# Patient Record
Sex: FEMALE | Race: WHITE | NOT HISPANIC OR LATINO | URBAN - METROPOLITAN AREA
[De-identification: names, ages, dates, MRNs, and addresses within clinical notes are randomized per-mention and may not be internally consistent; named-entity substitution may affect disease eponyms.]

---

## 2022-11-04 ENCOUNTER — OFFICE VISIT (OUTPATIENT)
Dept: URBAN - METROPOLITAN AREA CLINIC 46 | Facility: CLINIC | Age: 32
End: 2022-11-04

## 2022-12-06 ENCOUNTER — LAB OUTSIDE AN ENCOUNTER (OUTPATIENT)
Dept: URBAN - METROPOLITAN AREA CLINIC 44 | Facility: CLINIC | Age: 32
End: 2022-12-06

## 2022-12-06 ENCOUNTER — OFFICE VISIT (OUTPATIENT)
Dept: URBAN - METROPOLITAN AREA CLINIC 44 | Facility: CLINIC | Age: 32
End: 2022-12-06
Payer: COMMERCIAL

## 2022-12-06 VITALS
HEIGHT: 66 IN | BODY MASS INDEX: 44.03 KG/M2 | TEMPERATURE: 97.9 F | WEIGHT: 274 LBS | HEART RATE: 79 BPM | DIASTOLIC BLOOD PRESSURE: 94 MMHG | SYSTOLIC BLOOD PRESSURE: 156 MMHG

## 2022-12-06 DIAGNOSIS — R10.84 GENERALIZED ABDOMINAL PAIN: ICD-10-CM

## 2022-12-06 DIAGNOSIS — R10.13 DYSPEPSIA: ICD-10-CM

## 2022-12-06 DIAGNOSIS — K90.89 BILE SALT-INDUCED DIARRHEA: ICD-10-CM

## 2022-12-06 PROCEDURE — 99204 OFFICE O/P NEW MOD 45 MIN: CPT | Performed by: INTERNAL MEDICINE

## 2022-12-06 NOTE — HPI-TODAY'S VISIT:
31 yo F presents for an OV to discuss chest pressure and diarrhea. Onset of chest pressure began over a year ago as she mentions episodes of reflux and subsequent parasternal pressure. She does not take any medications other than OTC antiacids which have mixed results.  She also mentions watery diarrhea but thinks thisis mostly related to her IBS that she was told she had vs bile salt diarrhea after her cholecystectomy 8 years ago. She has tried Mylanta but this has little effect. Diarrhea has been ongoing for several months. She associates diffuse abdominal pain and has been taking Tramadol which has not relieved her pain. Denies hematochezia.  Interestingly, she mentions a diagnosis of H pylori x 3 times without evidence of eradication.

## 2022-12-07 ENCOUNTER — TELEPHONE ENCOUNTER (OUTPATIENT)
Dept: URBAN - METROPOLITAN AREA CLINIC 46 | Facility: CLINIC | Age: 32
End: 2022-12-07

## 2022-12-08 PROBLEM — 102614006: Status: ACTIVE | Noted: 2022-12-08

## 2022-12-08 LAB
A/G RATIO: 1.5
ABSOLUTE BASOPHILS: 48
ABSOLUTE EOSINOPHILS: 156
ABSOLUTE LYMPHOCYTES: 2736
ABSOLUTE MONOCYTES: 516
ABSOLUTE NEUTROPHILS: 8544
ALBUMIN: 4
ALKALINE PHOSPHATASE: 68
ALT (SGPT): 35
AST (SGOT): 22
BASOPHILS: 0.4
BILIRUBIN, TOTAL: 0.5
BUN/CREATININE RATIO: (no result)
BUN: 9
C-REACTIVE PROTEIN, QUANT: 39.8
CALCIUM: 9.2
CARBON DIOXIDE, TOTAL: 24
CHLORIDE: 104
CREATININE: 0.52
EGFR: 126
EOSINOPHILS: 1.3
GLOBULIN, TOTAL: 2.7
GLUCOSE: 87
HEMATOCRIT: 40.1
HEMOGLOBIN: 13
LYMPHOCYTES: 22.8
MCH: 26.5
MCHC: 32.4
MCV: 81.8
MONOCYTES: 4.3
MPV: 9.9
NEUTROPHILS: 71.2
PLATELET COUNT: 317
POTASSIUM: 4
PROTEIN, TOTAL: 6.7
RDW: 14.5
RED BLOOD CELL COUNT: 4.9
SED RATE BY MODIFIED: 22
SODIUM: 140
WHITE BLOOD CELL COUNT: 12

## 2022-12-09 ENCOUNTER — TELEPHONE ENCOUNTER (OUTPATIENT)
Dept: URBAN - METROPOLITAN AREA CLINIC 46 | Facility: CLINIC | Age: 32
End: 2022-12-09

## 2022-12-13 ENCOUNTER — TELEPHONE ENCOUNTER (OUTPATIENT)
Dept: URBAN - METROPOLITAN AREA CLINIC 46 | Facility: CLINIC | Age: 32
End: 2022-12-13

## 2022-12-23 ENCOUNTER — TELEPHONE ENCOUNTER (OUTPATIENT)
Dept: URBAN - METROPOLITAN AREA CLINIC 46 | Facility: CLINIC | Age: 32
End: 2022-12-23

## 2023-01-06 ENCOUNTER — OFFICE VISIT (OUTPATIENT)
Dept: URBAN - METROPOLITAN AREA SURGERY CENTER 27 | Facility: SURGERY CENTER | Age: 33
End: 2023-01-06

## 2023-01-10 ENCOUNTER — TELEPHONE ENCOUNTER (OUTPATIENT)
Dept: URBAN - METROPOLITAN AREA CLINIC 46 | Facility: CLINIC | Age: 33
End: 2023-01-10

## 2023-02-08 ENCOUNTER — WEB ENCOUNTER (OUTPATIENT)
Dept: URBAN - METROPOLITAN AREA CLINIC 44 | Facility: CLINIC | Age: 33
End: 2023-02-08

## 2023-02-20 ENCOUNTER — OFFICE VISIT (OUTPATIENT)
Dept: URBAN - METROPOLITAN AREA CLINIC 48 | Facility: CLINIC | Age: 33
End: 2023-02-20
Payer: COMMERCIAL

## 2023-02-20 VITALS
DIASTOLIC BLOOD PRESSURE: 79 MMHG | SYSTOLIC BLOOD PRESSURE: 124 MMHG | WEIGHT: 278 LBS | BODY MASS INDEX: 44.68 KG/M2 | HEIGHT: 66 IN | HEART RATE: 80 BPM | TEMPERATURE: 98.1 F

## 2023-02-20 DIAGNOSIS — R10.84 GENERALIZED ABDOMINAL PAIN: ICD-10-CM

## 2023-02-20 DIAGNOSIS — R10.13 DYSPEPSIA: ICD-10-CM

## 2023-02-20 DIAGNOSIS — K90.89 BILE SALT-INDUCED DIARRHEA: ICD-10-CM

## 2023-02-20 PROCEDURE — 99214 OFFICE O/P EST MOD 30 MIN: CPT | Performed by: INTERNAL MEDICINE

## 2023-02-20 RX ORDER — IBUPROFEN 800 MG/1
1 TABLET WITH FOOD OR MILK AS NEEDED TABLET, FILM COATED ORAL
Status: ACTIVE | COMMUNITY

## 2023-02-20 NOTE — HPI-TODAY'S VISIT:
31 yo F presents for an OV to discuss chronic chest pressure and watery diarrhea. Onset of chest pressure began over a year ago as she mentions episodes of reflux and subsequent parasternal pressure. She does take steroids and ibuprofen chronically for migraines.   She also mentions watery diarrhea but thinks this is mostly related to her IBS that she was told she had vs bile salt diarrhea after her cholecystectomy 8 years ago. She has tried Mylanta but this has little effect. Diarrhea has been ongoing for several months. She associates diffuse abdominal pain and has been taking Tramadol which has not relieved her pain. Denies hematochezia.  Interestingly, she mentions a diagnosis of H pylori x 3 times without evidence of eradication. We recommended an EGD/Colonoscopy for further evaluation on her last visit, but she declined as she mentions that she is "nervous and scared of a diagnosis".

## 2023-02-21 ENCOUNTER — TELEPHONE ENCOUNTER (OUTPATIENT)
Dept: URBAN - METROPOLITAN AREA CLINIC 48 | Facility: CLINIC | Age: 33
End: 2023-02-21

## 2023-02-21 PROBLEM — 69980003: Status: ACTIVE | Noted: 2022-12-06

## 2023-02-21 RX ORDER — IBUPROFEN 800 MG/1
1 TABLET WITH FOOD OR MILK AS NEEDED TABLET, FILM COATED ORAL
Status: ACTIVE | COMMUNITY

## 2023-02-21 RX ORDER — PANTOPRAZOLE SODIUM 40 MG/1
1 TABLET TABLET, DELAYED RELEASE ORAL ONCE A DAY
Qty: 30 | OUTPATIENT
Start: 2023-02-21

## 2023-02-21 RX ORDER — CHOLESTYRAMINE 4 G/9G
1 SCOOP POWDER, FOR SUSPENSION ORAL ONCE A DAY
Qty: 30 | OUTPATIENT
Start: 2023-02-21

## 2023-03-02 ENCOUNTER — WEB ENCOUNTER (OUTPATIENT)
Dept: URBAN - METROPOLITAN AREA CLINIC 48 | Facility: CLINIC | Age: 33
End: 2023-03-02

## 2023-07-28 ENCOUNTER — DASHBOARD ENCOUNTERS (OUTPATIENT)
Age: 33
End: 2023-07-28

## 2023-07-31 ENCOUNTER — OFFICE VISIT (OUTPATIENT)
Dept: URBAN - METROPOLITAN AREA CLINIC 48 | Facility: CLINIC | Age: 33
End: 2023-07-31

## 2023-07-31 RX ORDER — IBUPROFEN 800 MG/1
1 TABLET WITH FOOD OR MILK AS NEEDED TABLET, FILM COATED ORAL
Status: ACTIVE | COMMUNITY

## 2023-07-31 RX ORDER — CHOLESTYRAMINE 4 G/9G
1 SCOOP POWDER, FOR SUSPENSION ORAL ONCE A DAY
Qty: 30 | Status: ACTIVE | COMMUNITY
Start: 2023-02-21

## 2023-07-31 RX ORDER — PANTOPRAZOLE SODIUM 40 MG/1
1 TABLET TABLET, DELAYED RELEASE ORAL ONCE A DAY
Qty: 30 | Status: ACTIVE | COMMUNITY
Start: 2023-02-21

## 2025-08-06 ENCOUNTER — OFFICE VISIT (OUTPATIENT)
Dept: URBAN - METROPOLITAN AREA CLINIC 48 | Facility: CLINIC | Age: 35
End: 2025-08-06

## 2025-08-06 RX ORDER — PANTOPRAZOLE SODIUM 40 MG/1
1 TABLET TABLET, DELAYED RELEASE ORAL ONCE A DAY
Qty: 30 | Status: ACTIVE | COMMUNITY
Start: 2023-02-21

## 2025-08-06 RX ORDER — IBUPROFEN 800 MG/1
1 TABLET WITH FOOD OR MILK AS NEEDED TABLET, FILM COATED ORAL
Status: ACTIVE | COMMUNITY

## 2025-08-06 RX ORDER — CHOLESTYRAMINE 4 G/9G
1 SCOOP POWDER, FOR SUSPENSION ORAL ONCE A DAY
Qty: 30 | Status: ACTIVE | COMMUNITY
Start: 2023-02-21